# Patient Record
Sex: MALE | Race: WHITE
[De-identification: names, ages, dates, MRNs, and addresses within clinical notes are randomized per-mention and may not be internally consistent; named-entity substitution may affect disease eponyms.]

---

## 2018-12-27 ENCOUNTER — HOSPITAL ENCOUNTER (OUTPATIENT)
Dept: HOSPITAL 47 - LABWHC1 | Age: 48
End: 2018-12-27
Attending: ORTHOPAEDIC SURGERY
Payer: COMMERCIAL

## 2018-12-27 DIAGNOSIS — M25.462: ICD-10-CM

## 2018-12-27 DIAGNOSIS — M17.12: ICD-10-CM

## 2018-12-27 DIAGNOSIS — M25.562: ICD-10-CM

## 2018-12-27 DIAGNOSIS — S83.242D: ICD-10-CM

## 2018-12-27 DIAGNOSIS — Z98.890: ICD-10-CM

## 2018-12-27 DIAGNOSIS — Z48.89: Primary | ICD-10-CM

## 2018-12-27 LAB
CELL CNT PNL FLD: 100
DEPRECATED POLYS # FLD: 92 %
MONONUC CELLS # FLD: 8 %
NUC CELL # FLD: (no result) /UL

## 2018-12-27 PROCEDURE — 87205 SMEAR GRAM STAIN: CPT

## 2018-12-27 PROCEDURE — 89060 EXAM SYNOVIAL FLUID CRYSTALS: CPT

## 2018-12-27 PROCEDURE — 89050 BODY FLUID CELL COUNT: CPT

## 2018-12-27 PROCEDURE — 87075 CULTR BACTERIA EXCEPT BLOOD: CPT

## 2018-12-27 PROCEDURE — 87070 CULTURE OTHR SPECIMN AEROBIC: CPT

## 2019-01-07 ENCOUNTER — HOSPITAL ENCOUNTER (EMERGENCY)
Dept: HOSPITAL 47 - EC | Age: 49
Discharge: HOME | End: 2019-01-07
Payer: COMMERCIAL

## 2019-01-07 ENCOUNTER — HOSPITAL ENCOUNTER (OUTPATIENT)
Dept: HOSPITAL 47 - LABWHC1 | Age: 49
Discharge: HOME | End: 2019-01-07
Attending: ORTHOPAEDIC SURGERY
Payer: COMMERCIAL

## 2019-01-07 VITALS — TEMPERATURE: 98.2 F

## 2019-01-07 VITALS — HEART RATE: 96 BPM | SYSTOLIC BLOOD PRESSURE: 134 MMHG | DIASTOLIC BLOOD PRESSURE: 90 MMHG

## 2019-01-07 VITALS — RESPIRATION RATE: 18 BRPM

## 2019-01-07 DIAGNOSIS — Z98.890: ICD-10-CM

## 2019-01-07 DIAGNOSIS — Z48.89: ICD-10-CM

## 2019-01-07 DIAGNOSIS — M17.12: ICD-10-CM

## 2019-01-07 DIAGNOSIS — M25.562: Primary | ICD-10-CM

## 2019-01-07 DIAGNOSIS — D47.3: Primary | ICD-10-CM

## 2019-01-07 DIAGNOSIS — M25.462: ICD-10-CM

## 2019-01-07 DIAGNOSIS — S83.242D: ICD-10-CM

## 2019-01-07 LAB
ALBUMIN SERPL-MCNC: 4.1 G/DL (ref 3.5–5)
ALP SERPL-CCNC: 76 U/L (ref 38–126)
ALT SERPL-CCNC: 58 U/L (ref 21–72)
ANION GAP SERPL CALC-SCNC: 11 MMOL/L
AST SERPL-CCNC: 27 U/L (ref 17–59)
BASOPHILS # BLD AUTO: 0 K/UL (ref 0–0.2)
BASOPHILS # BLD AUTO: 0 K/UL (ref 0–0.2)
BASOPHILS NFR BLD AUTO: 0 %
BASOPHILS NFR BLD AUTO: 1 %
BUN SERPL-SCNC: 15 MG/DL (ref 9–20)
CALCIUM SPEC-MCNC: 9.7 MG/DL (ref 8.4–10.2)
CHLORIDE SERPL-SCNC: 102 MMOL/L (ref 98–107)
CO2 SERPL-SCNC: 27 MMOL/L (ref 22–30)
EOSINOPHIL # BLD AUTO: 0.1 K/UL (ref 0–0.7)
EOSINOPHIL # BLD AUTO: 0.2 K/UL (ref 0–0.7)
EOSINOPHIL NFR BLD AUTO: 1 %
EOSINOPHIL NFR BLD AUTO: 2 %
ERYTHROCYTE [DISTWIDTH] IN BLOOD BY AUTOMATED COUNT: 4.33 M/UL (ref 4.3–5.9)
ERYTHROCYTE [DISTWIDTH] IN BLOOD BY AUTOMATED COUNT: 4.4 M/UL (ref 4.3–5.9)
ERYTHROCYTE [DISTWIDTH] IN BLOOD: 12.3 % (ref 11.5–15.5)
ERYTHROCYTE [DISTWIDTH] IN BLOOD: 12.6 % (ref 11.5–15.5)
GLUCOSE SERPL-MCNC: 93 MG/DL (ref 74–99)
HCT VFR BLD AUTO: 38.9 % (ref 39–53)
HCT VFR BLD AUTO: 39.9 % (ref 39–53)
HGB BLD-MCNC: 12.3 GM/DL (ref 13–17.5)
HGB BLD-MCNC: 12.7 GM/DL (ref 13–17.5)
INR PPP: 1 (ref ?–1.2)
KETONES UR QL STRIP.AUTO: (no result)
LYMPHOCYTES # SPEC AUTO: 1.2 K/UL (ref 1–4.8)
LYMPHOCYTES # SPEC AUTO: 1.3 K/UL (ref 1–4.8)
LYMPHOCYTES NFR SPEC AUTO: 12 %
LYMPHOCYTES NFR SPEC AUTO: 14 %
MCH RBC QN AUTO: 28 PG (ref 25–35)
MCH RBC QN AUTO: 29.4 PG (ref 25–35)
MCHC RBC AUTO-ENTMCNC: 30.9 G/DL (ref 31–37)
MCHC RBC AUTO-ENTMCNC: 32.7 G/DL (ref 31–37)
MCV RBC AUTO: 89.8 FL (ref 80–100)
MCV RBC AUTO: 90.6 FL (ref 80–100)
MONOCYTES # BLD AUTO: 0.5 K/UL (ref 0–1)
MONOCYTES # BLD AUTO: 0.5 K/UL (ref 0–1)
MONOCYTES NFR BLD AUTO: 5 %
MONOCYTES NFR BLD AUTO: 5 %
NEUTROPHILS # BLD AUTO: 7.2 K/UL (ref 1.3–7.7)
NEUTROPHILS # BLD AUTO: 7.6 K/UL (ref 1.3–7.7)
NEUTROPHILS NFR BLD AUTO: 76 %
NEUTROPHILS NFR BLD AUTO: 80 %
PH UR: 5.5 [PH] (ref 5–8)
PLATELET # BLD AUTO: 1094 K/UL (ref 150–450)
PLATELET # BLD AUTO: 1197 K/UL (ref 150–450)
POTASSIUM SERPL-SCNC: 4.9 MMOL/L (ref 3.5–5.1)
PROT SERPL-MCNC: 7.7 G/DL (ref 6.3–8.2)
PT BLD: 10.8 SEC (ref 9–12)
SODIUM SERPL-SCNC: 140 MMOL/L (ref 137–145)
SP GR UR: 1.02 (ref 1–1.03)
UROBILINOGEN UR QL STRIP: <2 MG/DL (ref ?–2)
WBC # BLD AUTO: 9.5 K/UL (ref 3.8–10.6)
WBC # BLD AUTO: 9.6 K/UL (ref 3.8–10.6)

## 2019-01-07 PROCEDURE — 82550 ASSAY OF CK (CPK): CPT

## 2019-01-07 PROCEDURE — 84550 ASSAY OF BLOOD/URIC ACID: CPT

## 2019-01-07 PROCEDURE — 80048 BASIC METABOLIC PNL TOTAL CA: CPT

## 2019-01-07 PROCEDURE — 84439 ASSAY OF FREE THYROXINE: CPT

## 2019-01-07 PROCEDURE — 71046 X-RAY EXAM CHEST 2 VIEWS: CPT

## 2019-01-07 PROCEDURE — 85610 PROTHROMBIN TIME: CPT

## 2019-01-07 PROCEDURE — 86812 HLA TYPING A B OR C: CPT

## 2019-01-07 PROCEDURE — 84443 ASSAY THYROID STIM HORMONE: CPT

## 2019-01-07 PROCEDURE — 85025 COMPLETE CBC W/AUTO DIFF WBC: CPT

## 2019-01-07 PROCEDURE — 86140 C-REACTIVE PROTEIN: CPT

## 2019-01-07 PROCEDURE — 99284 EMERGENCY DEPT VISIT MOD MDM: CPT

## 2019-01-07 PROCEDURE — 84460 ALANINE AMINO (ALT) (SGPT): CPT

## 2019-01-07 PROCEDURE — 86038 ANTINUCLEAR ANTIBODIES: CPT

## 2019-01-07 PROCEDURE — 80053 COMPREHEN METABOLIC PANEL: CPT

## 2019-01-07 PROCEDURE — 82306 VITAMIN D 25 HYDROXY: CPT

## 2019-01-07 PROCEDURE — 86431 RHEUMATOID FACTOR QUANT: CPT

## 2019-01-07 PROCEDURE — 81003 URINALYSIS AUTO W/O SCOPE: CPT

## 2019-01-07 PROCEDURE — 85652 RBC SED RATE AUTOMATED: CPT

## 2019-01-07 PROCEDURE — 82164 ANGIOTENSIN I ENZYME TEST: CPT

## 2019-01-07 PROCEDURE — 36415 COLL VENOUS BLD VENIPUNCTURE: CPT

## 2019-01-07 PROCEDURE — 96360 HYDRATION IV INFUSION INIT: CPT

## 2019-01-07 PROCEDURE — 86200 CCP ANTIBODY: CPT

## 2019-01-07 PROCEDURE — 84450 TRANSFERASE (AST) (SGOT): CPT

## 2019-01-07 NOTE — XR
EXAMINATION TYPE: XR chest 2V

 

DATE OF EXAM: 1/7/2019

 

COMPARISON: NONE

 

HISTORY: Chest pain. Abnormal labs.

 

TECHNIQUE:  Frontal and lateral views of the chest are obtained.

 

FINDINGS:  Overlying EKG leads are seen. There is no focal air space opacity, pleural effusion, or pn
eumothorax seen.  The cardiac silhouette size is within normal limits.   The osseous structures are i
ntact.

 

IMPRESSION:  No acute cardiopulmonary process.

## 2019-01-07 NOTE — US
EXAMINATION TYPE: US venous doppler duplex LE LT

 

DATE OF EXAM: 1/7/2019 5:20 PM

 

COMPARISON: NONE

 

CLINICAL HISTORY: Pain. High platelets post op knee surgery.

 

SIDE PERFORMED: Left  

 

TECHNIQUE:  The lower extremity deep venous system is examined utilizing real time linear array sonog
jennifer with graded compression, doppler sonography and color-flow sonography.

 

VESSELS IMAGED:

External Iliac Vein (EIV)

Common Femoral Vein

Deep Femoral Vein

Greater Saphenous Vein *

Femoral Vein

Popliteal Vein

Small Saphenous Vein *

Proximal Calf Veins

(* superficial vessels)

 

Left Leg:  Negative for DVT

 

No evidence of DVT left leg. Grayscale, color doppler, spectral doppler imaging performed of the deep
 veins of the left lower extremity.  There is normal flow, compressibility, vascular waveforms.    

 

IMPRESSION: No ultrasound evidence for acute DVT in the left lower extremity.

## 2019-01-07 NOTE — ED
Recheck HPI





- General


Chief Complaint: Recheck/Abnormal Lab/Rx


Stated Complaint: Critically low platelets, Abnormal Labs


Time Seen by Provider: 01/07/19 17:08


Source: patient, RN notes reviewed, old records reviewed


Mode of arrival: ambulatory


Limitations: no limitations





- History of Present Illness


Initial Comments: 





48-year-old male presenting for evaluation of elevated platelets.  Patient was 

sent in the orthopedic surgeon with critical high platelet level.  Patient has 

been dealing with some effusion in the left knee status post arthroscopic 

procedure approximately one month ago.  His had arthrocentesis performed, work 

up with orthopedic surgery.  Patient's had outpatient labs obtained today.  

This showed a platelet level greater than thousand.  Patient has no chest pain 

or dyspnea.  No abdominal pain.  No nausea vomiting.  No fever or chills.  He 

does note swelling the left knee, no warmth or redness.  No calf tenderness.  

No focal weakness or numbness.





- Related Data


 Home Medications











 Medication  Instructions  Recorded  Confirmed


 


HYDROcodone/APAP 5-325MG [Norco 0.5 - 1 tab PO HS PRN 01/07/19 01/07/19





5-325]   


 


Ibuprofen [Motrin Ib] 400 mg PO Q6H PRN 01/07/19 01/07/19











 Allergies











Allergy/AdvReac Type Severity Reaction Status Date / Time


 


No Known Allergies Allergy   Verified 01/07/19 17:21














Review of Systems


ROS Statement: 


Those systems with pertinent positive or pertinent negative responses have been 

documented in the HPI.





ROS Other: All systems not noted in ROS Statement are negative.





Past Medical History


Past Medical History: No Reported History


History of Any Multi-Drug Resistant Organisms: None Reported


Past Surgical History: Orthopedic Surgery


Past Psychological History: No Psychological Hx Reported


Smoking Status: Never smoker


Past Alcohol Use History: Daily


Past Drug Use History: None Reported





General Exam


Limitations: no limitations


General appearance: alert, in no apparent distress


Head exam: Present: atraumatic, normocephalic


Eye exam: Present: normal appearance, PERRL, EOMI


ENT exam: Present: normal exam, normal oropharynx


Neck exam: Present: normal inspection.  Absent: tenderness, meningismus


Respiratory exam: Present: normal lung sounds bilaterally.  Absent: respiratory 

distress, wheezes


Cardiovascular Exam: Present: regular rate, normal rhythm


GI/Abdominal exam: Present: soft.  Absent: distended, tenderness, guarding


Extremities exam: Present: other (Left knee effusion, no warmth, no erythema, 

bandage to the lateral aspect status post arthrocentesis.).  Absent: calf 

tenderness


Neurological exam: Present: alert, oriented X3, CN II-XII intact.  Absent: 

motor sensory deficit


Psychiatric exam: Present: normal affect, normal mood


Skin exam: Present: warm, dry, intact.  Absent: cyanosis, diaphoretic





Course


 Vital Signs











  01/07/19 01/07/19 01/07/19





  13:46 17:40 18:00


 


Temperature 98.2 F  


 


Pulse Rate 125 H 92 108 H


 


Respiratory 16 20 20





Rate   


 


Blood Pressure 128/86 131/98 127/89


 


O2 Sat by Pulse 97 97 97





Oximetry   














  01/07/19 01/07/19





  19:00 20:00


 


Temperature  98.2 F


 


Pulse Rate 94 96


 


Respiratory 18 18





Rate  


 


Blood Pressure 135/88 134/90


 


O2 Sat by Pulse 98 98





Oximetry  














Medical Decision Making





- Medical Decision Making





48-year-old male presenting with elevated platelets.  This was obtained on an 

outpatient lab test.  Patient has been dealing with recurrent pleural effusion 

on the left status post arthroscopic procedure.  Patient was sent in for 

evaluation of elevated platelets.  I did review asked medical record which 

included hemorrhagic arthrocentesis from approximately 2 weeks ago.  Patient 

has stable hemoglobin, platelets are elevated on redraw at 1094 which is down 

trending from outpatient study.  Patient also had an elevated ESR and 

outpatient testing at 89.  Ultrasound is obtained of the left leg which is 

negative for DVT.  Chest x-ray negative for any acute cardiopulmonary disease.  

Patient's symptoms are ongoing and unchanged.  He is afebrile with a normal 

white count.  I have very low suspicion for infection in this knee.  Platelet 

count is likely reactive secondary to hemorrhagic left knee effusion.  I did 

discuss case with orthopedics on call Dr. Ackerman, he indicated that this was 

a miscommunication, initial concern was that the patient's platelets were very 

low rather than elevated.  We agreed that this patient can follow up as an 

outpatient, he's given outpatient contact information to his primary care 

doctor.  We will also call orthopedics in the morning for reevaluation.  He 

will continue to monitor for fever, worsening pain and left knee, worsening 

swelling or erythema.





- Lab Data


Result diagrams: 


 01/07/19 16:00





 01/07/19 16:00


 Lab Results











  01/07/19 01/07/19 01/07/19 Range/Units





  16:00 16:00 16:00 


 


WBC  9.5    (3.8-10.6)  k/uL


 


RBC  4.33    (4.30-5.90)  m/uL


 


Hgb  12.7 L    (13.0-17.5)  gm/dL


 


Hct  38.9 L    (39.0-53.0)  %


 


MCV  89.8    (80.0-100.0)  fL


 


MCH  29.4    (25.0-35.0)  pg


 


MCHC  32.7    (31.0-37.0)  g/dL


 


RDW  12.6    (11.5-15.5)  %


 


Plt Count  1094 H*    (150-450)  k/uL


 


Neutrophils %  76    %


 


Lymphocytes %  14    %


 


Monocytes %  5    %


 


Eosinophils %  2    %


 


Basophils %  0    %


 


Neutrophils #  7.2    (1.3-7.7)  k/uL


 


Lymphocytes #  1.3    (1.0-4.8)  k/uL


 


Monocytes #  0.5    (0-1.0)  k/uL


 


Eosinophils #  0.2    (0-0.7)  k/uL


 


Basophils #  0.0    (0-0.2)  k/uL


 


PT    10.8  (9.0-12.0)  sec


 


INR    1.0  (<1.2)  


 


Sodium   140   (137-145)  mmol/L


 


Potassium   4.9   (3.5-5.1)  mmol/L


 


Chloride   102   ()  mmol/L


 


Carbon Dioxide   27   (22-30)  mmol/L


 


Anion Gap   11   mmol/L


 


BUN   15   (9-20)  mg/dL


 


Creatinine   0.86   (0.66-1.25)  mg/dL


 


Est GFR (CKD-EPI)AfAm   >90   (>60 ml/min/1.73 sqM)  


 


Est GFR (CKD-EPI)NonAf   >90   (>60 ml/min/1.73 sqM)  


 


Glucose   93   (74-99)  mg/dL


 


Calcium   9.7   (8.4-10.2)  mg/dL


 


Total Bilirubin   0.5   (0.2-1.3)  mg/dL


 


AST   27   (17-59)  U/L


 


ALT   58   (21-72)  U/L


 


Alkaline Phosphatase   76   ()  U/L


 


Total Protein   7.7   (6.3-8.2)  g/dL


 


Albumin   4.1   (3.5-5.0)  g/dL


 


Urine Color     


 


Urine Appearance     (Clear)  


 


Urine pH     (5.0-8.0)  


 


Ur Specific Gravity     (1.001-1.035)  


 


Urine Protein     (Negative)  


 


Urine Glucose (UA)     (Negative)  


 


Urine Ketones     (Negative)  


 


Urine Blood     (Negative)  


 


Urine Nitrite     (Negative)  


 


Urine Bilirubin     (Negative)  


 


Urine Urobilinogen     (<2.0)  mg/dL


 


Ur Leukocyte Esterase     (Negative)  














  01/07/19 Range/Units





  18:15 


 


WBC   (3.8-10.6)  k/uL


 


RBC   (4.30-5.90)  m/uL


 


Hgb   (13.0-17.5)  gm/dL


 


Hct   (39.0-53.0)  %


 


MCV   (80.0-100.0)  fL


 


MCH   (25.0-35.0)  pg


 


MCHC   (31.0-37.0)  g/dL


 


RDW   (11.5-15.5)  %


 


Plt Count   (150-450)  k/uL


 


Neutrophils %   %


 


Lymphocytes %   %


 


Monocytes %   %


 


Eosinophils %   %


 


Basophils %   %


 


Neutrophils #   (1.3-7.7)  k/uL


 


Lymphocytes #   (1.0-4.8)  k/uL


 


Monocytes #   (0-1.0)  k/uL


 


Eosinophils #   (0-0.7)  k/uL


 


Basophils #   (0-0.2)  k/uL


 


PT   (9.0-12.0)  sec


 


INR   (<1.2)  


 


Sodium   (137-145)  mmol/L


 


Potassium   (3.5-5.1)  mmol/L


 


Chloride   ()  mmol/L


 


Carbon Dioxide   (22-30)  mmol/L


 


Anion Gap   mmol/L


 


BUN   (9-20)  mg/dL


 


Creatinine   (0.66-1.25)  mg/dL


 


Est GFR (CKD-EPI)AfAm   (>60 ml/min/1.73 sqM)  


 


Est GFR (CKD-EPI)NonAf   (>60 ml/min/1.73 sqM)  


 


Glucose   (74-99)  mg/dL


 


Calcium   (8.4-10.2)  mg/dL


 


Total Bilirubin   (0.2-1.3)  mg/dL


 


AST   (17-59)  U/L


 


ALT   (21-72)  U/L


 


Alkaline Phosphatase   ()  U/L


 


Total Protein   (6.3-8.2)  g/dL


 


Albumin   (3.5-5.0)  g/dL


 


Urine Color  Yellow  


 


Urine Appearance  Clear  (Clear)  


 


Urine pH  5.5  (5.0-8.0)  


 


Ur Specific Gravity  1.017  (1.001-1.035)  


 


Urine Protein  Negative  (Negative)  


 


Urine Glucose (UA)  Negative  (Negative)  


 


Urine Ketones  1+ H  (Negative)  


 


Urine Blood  Negative  (Negative)  


 


Urine Nitrite  Negative  (Negative)  


 


Urine Bilirubin  Negative  (Negative)  


 


Urine Urobilinogen  <2.0  (<2.0)  mg/dL


 


Ur Leukocyte Esterase  Negative  (Negative)  














Disposition


Clinical Impression: 


 Reactive thrombocytosis





Disposition: HOME SELF-CARE


Condition: Good


Instructions:  Swollen Knee Joint (ED)


Additional Instructions: 


Please return with concerns for infection in left knee including fever, chills, 

worsening swelling or redness.


Is patient prescribed a controlled substance at d/c from ED?: No


Referrals: 


None,Stated [Primary Care Provider] - 1-2 days


Eladio Jimenez MD [STAFF PHYSICIAN] - 1-2 days


Linda Toledo MD [STAFF PHYSICIAN] - 1-2 days


Time of Disposition: 20:10

## 2019-01-08 LAB
ACE SERPL-CCNC: 28 U/L (ref 8–52)
ALT SERPL-CCNC: 53 U/L (ref 10–49)
ANION GAP SERPL CALC-SCNC: 11 MMOL/L (ref 4–12)
AST SERPL-CCNC: 25 U/L (ref 14–35)
BUN SERPL-SCNC: 15 MG/DL (ref 9–27)
CALCIUM SPEC-MCNC: 9.7 MG/DL (ref 8.7–10.3)
CHLORIDE SERPL-SCNC: 100 MMOL/L (ref 96–109)
CK SERPL-CCNC: 52 U/L (ref 35–257)
CO2 SERPL-SCNC: 28 MMOL/L (ref 21.6–31.8)
GLUCOSE SERPL-MCNC: 85 MG/DL (ref 70–110)
HLA-B27 QL: POSITIVE
POTASSIUM SERPL-SCNC: 5 MMOL/L (ref 3.5–5.5)
SODIUM SERPL-SCNC: 139 MMOL/L (ref 135–145)
T4 FREE SERPL-MCNC: 1.5 NG/DL (ref 0.8–1.8)
URATE SERPL-MCNC: 3.7 MG/DL (ref 3.7–8.7)

## 2022-04-12 ENCOUNTER — HOSPITAL ENCOUNTER (OUTPATIENT)
Dept: HOSPITAL 47 - ORWHC2ENDO | Age: 52
End: 2022-04-12
Attending: SURGERY
Payer: COMMERCIAL

## 2022-04-12 VITALS — BODY MASS INDEX: 31.4 KG/M2

## 2022-04-12 VITALS — SYSTOLIC BLOOD PRESSURE: 137 MMHG | HEART RATE: 77 BPM | DIASTOLIC BLOOD PRESSURE: 67 MMHG

## 2022-04-12 VITALS — RESPIRATION RATE: 16 BRPM | TEMPERATURE: 97.1 F

## 2022-04-12 DIAGNOSIS — Z87.891: ICD-10-CM

## 2022-04-12 DIAGNOSIS — K21.9: ICD-10-CM

## 2022-04-12 DIAGNOSIS — Z79.899: ICD-10-CM

## 2022-04-12 DIAGNOSIS — K22.70: ICD-10-CM

## 2022-04-12 DIAGNOSIS — K44.9: ICD-10-CM

## 2022-04-12 DIAGNOSIS — K29.70: Primary | ICD-10-CM

## 2022-04-12 PROCEDURE — 88305 TISSUE EXAM BY PATHOLOGIST: CPT

## 2022-04-12 PROCEDURE — 43239 EGD BIOPSY SINGLE/MULTIPLE: CPT

## 2022-04-12 NOTE — P.PCN
Date of Procedure: 04/12/22


Procedure(s) Performed: 


Preoperative Dx: GERD


Postoperative Dx: Mild gastritis, hiatal hernia, short segment Hadley's 

esophagus


Procedure: EGD with Bx


Anesthesia: Sedation


Endoscopist: Dr. Prescott


Specimens: Antrum, Hadley's esophagus


Endoscopic Procedure:   The patient was on the endoscopy table in the left 

decubitus position.  The Olympus gastroscope was inserted into the oropharynx 

and passed under direct visualization to the region of the third portion of the 

duodenum.  From that point the scope was slowly withdrawn inspecting all 

surfaces carefully.  There were no neoplastic inflammatory or polypoid lesions 

throughout the duodenum.  The pylorus was widely patent.  The stomach was 

carefully inspected.  There was mild gastritis present.  A biopsy of the antrum 

took place to rule out H. pylori.  Retroflexion revealed a sliding hiatal 

hernia.  The GE junction was present 2 cm above the diaphragmatic hiatus.  The 

patient's previous esophagitis was resolved.  There was a short segment 

measuring 2 cm of Hadley's esophagus.  A biopsy of the Hadley's took place.  

The esophagus was then carefully examined.  There were no neoplastic 

inflammatory or polypoid lesions throughout the visualized esophagus.  The 

patient was then taken to the recovery room in stable condition per anesthesia 

guidelines.


Recommendations: Await biopsy results.  Continue antiacid therapy.  Follow-up 

EGD 3 years.

## 2022-04-12 NOTE — P.GSHP
History of Present Illness


H&P Date: 04/12/22


Chief Complaint: GERD





52-year-old male underwent EGD last September.  Patient was found have severe 

erosive esophagitis.  He was started on antiacid therapy.  Symptoms have 

improved since starting antiacids.  Biopsies were negative for dysplasia.  

Short-term follow-up was advised.





Past Medical History


Past Medical History: GERD/Reflux


History of Any Multi-Drug Resistant Organisms: None Reported


Past Surgical History: Orthopedic Surgery


Additional Past Surgical History / Comment(s): LEFT KNEE-ARTHROSCOPIC.  

COLONOSCOPY/EGD-9/2021


Past Anesthesia/Blood Transfusion Reactions: No Reported Reaction


Smoking Status: Former smoker





- Past Family History


  ** Mother


Family Medical History: No Reported History





Medications and Allergies


                                Home Medications











 Medication  Instructions  Recorded  Confirmed  Type


 


Omeprazole [PriLOSEC] 40 mg PO AC-BRKFST #90 cap 09/21/21 04/12/22 Rx








                                    Allergies











Allergy/AdvReac Type Severity Reaction Status Date / Time


 


No Known Allergies Allergy   Verified 04/07/22 12:49














Surgical - Exam


                                   Vital Signs











Temp Pulse Resp BP Pulse Ox


 


 97.1 F L  89   16   152/84   96 


 


 04/12/22 09:15  04/12/22 09:15  04/12/22 09:15  04/12/22 09:15  04/12/22 09:15

















Physical exam:


General: Well-developed, well-nourished


HEENT: Normocephalic, sclerae nonicteric


Abdomen: Nontender, nondistended


Extremities: No edema


Neuro: Alert and oriented





Assessment and Plan


(1) GERD (gastroesophageal reflux disease)


Narrative/Plan: 


Will proceed with upper endoscopy at this time


Current Visit: Yes   Status: Acute   Code(s): K21.9 - GASTRO-ESOPHAGEAL REFLUX 

DISEASE WITHOUT ESOPHAGITIS   SNOMED Code(s): 405418085